# Patient Record
(demographics unavailable — no encounter records)

---

## 2024-11-06 NOTE — HISTORY OF PRESENT ILLNESS
[de-identified] : here with wife w many questions Patient comes in with more than 6 months of left knee pain atraumatic in onset.  Patient has tried greater than 6 months of nsaids, physical therapy and doctor directed exercises and injections.  Patient continues to have pain that is 8/10 in severity and interferes with activities of daily living such as putting on shoes and socks, walking two blocks, and getting up and down from a chair and toilet.  Knee osteoarthritis outcome score is 8.1

## 2024-11-06 NOTE — PHYSICAL EXAM
[de-identified] : Knee ranges 5-115 degrees with pain and crepitus stable to varus, valgus, anterior and posterior stress neurovascularly intact distally no pain with hip range of motion negative straight leg raise no effusion, synovitis, or edema or erythema skin intact left knee

## 2024-11-06 NOTE — DISCUSSION/SUMMARY
[de-identified] : This patient has failed non-operative treatments for left knee arthritis and is now indicated for a total knee replacement.  I had a long discussion with the patient regarding the plan, the expected outcome, the risks, benefits, and alternatives of surgery. The risks include, but are not limited to, infection (which may require future surgery and removal of implants), bleeding (which may require a transfusion), damage to nerves, arteries, veins, tendons, muscles and other adjacent structures leading to numbness, weakness, decreased function and/or possible surgical repair. Also discussed the possibilities of intraoperative and post operative fractures, implant loosening, stiffness, need for extensive therapy and revision for variety of reasons, blood clots and other possible medical complications etc.  >25 mins

## 2025-01-08 NOTE — PHYSICAL EXAM
[de-identified] : incision is well healed, clean dry and intact knee ranges from 0-90 degrees stable to aneror, posterior, varus and valgus stress no motor or sensory deficits and neurovasculary intact no clubbing, cyanosis or edema normal gait [de-identified] : 3 views left knee show well positioned tka

## 2025-01-08 NOTE — HISTORY OF PRESENT ILLNESS
[de-identified] : patient is about one month out from their joint replacement.  overall doing well.  no major complaints.  pain well controlled.

## 2025-01-08 NOTE — DISCUSSION/SUMMARY
[de-identified] : one month out from joint replacement doing well we discussed medications, activities, precautions and fu since he is a little stiff i will see him in 3-4 weeks for rom check

## 2025-01-29 NOTE — DISCUSSION/SUMMARY
[de-identified] : doing well improving will provide new pt script will fu in 3-4 weeks since continuing to improve

## 2025-01-29 NOTE — PHYSICAL EXAM
[de-identified] : incision is well healed, clean dry and intact knee ranges from 0-110 degrees stable to aneror, posterior, varus and valgus stress no motor or sensory deficits and neurovasculary intact no clubbing, cyanosis or edema normal gait

## 2025-02-26 NOTE — PHYSICAL EXAM
[de-identified] : incision is well healed, clean dry and intact knee ranges from 0-120 degrees stable to aneror, posterior, varus and valgus stress no motor or sensory deficits and neurovasculary intact no clubbing, cyanosis or edema normal gait

## 2025-02-26 NOTE — DISCUSSION/SUMMARY
[de-identified] : 10 weeks out from joint replacement doing well we discussed medications, activities, precautions and fu

## 2025-05-21 NOTE — HISTORY OF PRESENT ILLNESS
[de-identified] : 1-2 months of right medial knee pain after twisting and feeling pop not getting worse tried exercises and nsaids feels clicking

## 2025-05-21 NOTE — DISCUSSION/SUMMARY
[de-identified] : likely meniscus tear will get mri to confirm discussed scope vs injections and pt will decide after mri

## 2025-05-21 NOTE — PHYSICAL EXAM
[de-identified] : mild effusion rom 0-120 +vicente +medial joint line tenderness no pain w hip rom neuro intact [de-identified] : 4 views right knee ap/pa/lat/sunrise show mild medial narrowing

## 2025-06-11 NOTE — DISCUSSION/SUMMARY
[de-identified] : telehealth  he is home  im in [place] office  right knee symptoms unchanged MRI review MRI shows complex tear of medial meniscus and mild OA  discussed that scope may not help with OA discussed tka, meniscus surgery and injections for now he will think about it and let us know